# Patient Record
Sex: FEMALE | Race: WHITE
[De-identification: names, ages, dates, MRNs, and addresses within clinical notes are randomized per-mention and may not be internally consistent; named-entity substitution may affect disease eponyms.]

---

## 2021-02-26 ENCOUNTER — HOSPITAL ENCOUNTER (EMERGENCY)
Dept: HOSPITAL 54 - ER | Age: 56
Discharge: HOME | End: 2021-02-26
Payer: MEDICAID

## 2021-02-26 VITALS — DIASTOLIC BLOOD PRESSURE: 76 MMHG | SYSTOLIC BLOOD PRESSURE: 132 MMHG

## 2021-02-26 VITALS — HEIGHT: 63 IN | WEIGHT: 132 LBS | BODY MASS INDEX: 23.39 KG/M2

## 2021-02-26 DIAGNOSIS — F32.9: Primary | ICD-10-CM

## 2021-02-26 DIAGNOSIS — F41.9: ICD-10-CM

## 2021-02-26 LAB
ALBUMIN SERPL BCP-MCNC: 3.7 G/DL (ref 3.4–5)
ALP SERPL-CCNC: 76 U/L (ref 46–116)
ALT SERPL W P-5'-P-CCNC: 63 U/L (ref 12–78)
APAP SERPL-MCNC: 9 UG/ML (ref 10–30)
AST SERPL W P-5'-P-CCNC: 43 U/L (ref 15–37)
BASOPHILS # BLD AUTO: 0.1 /CMM (ref 0–0.2)
BASOPHILS NFR BLD AUTO: 0.8 % (ref 0–2)
BILIRUB DIRECT SERPL-MCNC: 0.1 MG/DL (ref 0–0.2)
BILIRUB SERPL-MCNC: 0.4 MG/DL (ref 0.2–1)
BUN SERPL-MCNC: 19 MG/DL (ref 7–18)
CALCIUM SERPL-MCNC: 8.5 MG/DL (ref 8.5–10.1)
CHLORIDE SERPL-SCNC: 102 MMOL/L (ref 98–107)
CO2 SERPL-SCNC: 25 MMOL/L (ref 21–32)
COLOR UR: YELLOW
CREAT SERPL-MCNC: 0.7 MG/DL (ref 0.6–1.3)
EOSINOPHIL NFR BLD AUTO: 3.1 % (ref 0–6)
ETHANOL SERPL-MCNC: < 3 MG/DL (ref 0–0)
GLUCOSE SERPL-MCNC: 98 MG/DL (ref 74–106)
HCT VFR BLD AUTO: 35 % (ref 33–45)
HGB BLD-MCNC: 11.9 G/DL (ref 11.5–14.8)
LYMPHOCYTES NFR BLD AUTO: 3.3 /CMM (ref 0.8–4.8)
LYMPHOCYTES NFR BLD AUTO: 45.5 % (ref 20–44)
MCHC RBC AUTO-ENTMCNC: 35 G/DL (ref 31–36)
MCV RBC AUTO: 94 FL (ref 82–100)
MONOCYTES NFR BLD AUTO: 0.5 /CMM (ref 0.1–1.3)
MONOCYTES NFR BLD AUTO: 6.7 % (ref 2–12)
NEUTROPHILS # BLD AUTO: 3.2 /CMM (ref 1.8–8.9)
NEUTROPHILS NFR BLD AUTO: 43.9 % (ref 43–81)
PH UR STRIP: 5.5 [PH] (ref 5–8)
PLATELET # BLD AUTO: 314 /CMM (ref 150–450)
POTASSIUM SERPL-SCNC: 3.8 MMOL/L (ref 3.5–5.1)
PROT SERPL-MCNC: 7.4 G/DL (ref 6.4–8.2)
RBC # BLD AUTO: 3.69 MIL/UL (ref 4–5.2)
RBC #/AREA URNS HPF: (no result) /HPF (ref 0–2)
SODIUM SERPL-SCNC: 139 MMOL/L (ref 136–145)
UROBILINOGEN UR STRIP-MCNC: 0.2 EU/DL
WBC #/AREA URNS HPF: (no result) /HPF (ref 0–3)
WBC NRBC COR # BLD AUTO: 7.4 K/UL (ref 4.3–11)

## 2021-02-26 PROCEDURE — G0480 DRUG TEST DEF 1-7 CLASSES: HCPCS

## 2021-02-26 NOTE — NUR
SS Consult :

SS Consult requested for SI w/no plan. The pt. is a 55-year old  
female who was BIBRA after she called 911 from her vehicle stating she was 
depressed & anxious, per EMR. Per MD note pt. stated she wants to hurt herself. 
SW met with pt. bedside and pt. was receptive. Pt. is alert & oriented. The pt. 
appears well-groomed and makes appropriate eye contact. Pt. presents with 
anxious affect & tangential speech. The pt. stated she resides at [[address] [place], [place] [id number]; [phone]] with her boyfriend and 2 roommates 
inside the house. Pt. stated another male resides outside in an encampment. SW 
explored pt.'s SI. Pt. denies current SI to this SW. Pt stated she fractured 
her skull 20 years ago and which causes her to have has "horrible, vivid 
dreams". Per pt. she woke up this morning after having a "bad dream" and she 
fought with her boyfriend. Per pt. that triggered the SI today. Patient stated 
"I wish I wasn't alive, but I wouldn't kill myself". REJI explored pt.'s mental 
health Hx. Pt. stated she just began seeing an outpatient psychiatrist and is 
taking antidepressants. Pt. could not provide psychiatrist phone number or name 
of antidepressants. Pt. stated that the last time she attempted to commit 
suicide was about 20 years ago. Pt. denies past psychiatric hospitalization. 
Per pt. she has suffered from depression her entire life. The pt. denies 
current HI & denies hallucinations. Pt. with fair insight and poor judgement. 
REJI explored pt.'s drug use. Pt. stated she uses meth 2x/day. Pt. stated, "I use 
only because my boyfriend uses". REJI offered pt. voluntary psychiatric 
hospitalization for stabilization and medication management. Pt. refused 
stating, "I do not feel suicidal anymore and I want to go home". REJI discussed 
case with charge nurse, Leatha. Dr. Varghese requesting crisis evaluation for this 
Pt. 



Plan: REJI placed drug addiction and mental health resources in the pt.'s 
discharge packet. REJI called crisis clinician, Art [phone] and discussed 
pt. Per Art, he will come assess the pt. 



SW placed the following resources in pt.'s discharge packet: 

Substance Abuse resources provided included: [facility] Substance Abuse 
Self-Helpline ([facility]) [phone]; [facility] [address] [facility]. [place] [address] [phone]; [facility] [address] [facility]. 
[facility] [address] [phone]; [facility] [address] 
[place] [address] [facility] [address] [phone]; [facility] 
[address] [facility] [id number] [phone];  [facility] [address] [facility] [address] [phone]; [place] 
[facility] [address] [facility] [id number] [phone]; [facility] 
Substance Abuse Helpline([facility] [phone]; [facility]  [phone]; [facility] [phone] [facility]  [phone] [place]; [facility]  [phone] [place]; [facility] [phone] [place]; 
[facility]  [phone] [place]; [facility] [phone] 
[place]; [facility] [phone] [facility] [phone] [place]; Alcoholics Anonymous [phone] 194-5160-SFV; 
[facility] [phone]; [facility] [phone] 472-1001-SFV; 
Narcotics Anonymous www.na.org;

Mental Health resources provided: [facility] [address] [facility] [place] [address] [phone]; [facility] [address] 
[facility] [address] [place] [address] [phone]; [facility] [address] [place], [place] [id number] [phone]; [facility] [address] [place], [place] 
[id number] [phone]

Healthcare Clinics: [facility] [address] [facility] [place]. [place] [phone]; [facility] [address] 
[facility] [address] [place] [address]; [facility] [address] [facility] [address] [phone]

## 2021-02-26 NOTE — NUR
[facility], called 911 from her car- c/o severe anxiety and depression. Pt also has 
thoughts of harming herself but no plan. Pt AAOx4, vss. Denies cp, sob, 
dizziness, n/v at this time. Pt seen and eval'd by Dr. Varghese. Sitleyda at bedside 
and will cont to monitor.